# Patient Record
Sex: MALE | Race: ASIAN | Employment: OTHER | URBAN - METROPOLITAN AREA
[De-identification: names, ages, dates, MRNs, and addresses within clinical notes are randomized per-mention and may not be internally consistent; named-entity substitution may affect disease eponyms.]

---

## 2017-01-01 ENCOUNTER — OFFICE VISIT (OUTPATIENT)
Dept: FAMILY MEDICINE | Facility: CLINIC | Age: 42
End: 2017-01-01
Payer: COMMERCIAL

## 2017-01-01 ENCOUNTER — NURSE TRIAGE (OUTPATIENT)
Dept: NURSING | Facility: CLINIC | Age: 42
End: 2017-01-01

## 2017-01-01 ENCOUNTER — TELEPHONE (OUTPATIENT)
Dept: NURSING | Facility: CLINIC | Age: 42
End: 2017-01-01

## 2017-01-01 VITALS
DIASTOLIC BLOOD PRESSURE: 88 MMHG | OXYGEN SATURATION: 99 % | HEART RATE: 77 BPM | HEIGHT: 70 IN | RESPIRATION RATE: 16 BRPM | BODY MASS INDEX: 25.05 KG/M2 | WEIGHT: 175 LBS | SYSTOLIC BLOOD PRESSURE: 120 MMHG | TEMPERATURE: 98.1 F

## 2017-01-01 DIAGNOSIS — I10 BENIGN ESSENTIAL HYPERTENSION: ICD-10-CM

## 2017-01-01 DIAGNOSIS — M25.562 ACUTE PAIN OF LEFT KNEE: ICD-10-CM

## 2017-01-01 DIAGNOSIS — Z00.01 ENCOUNTER FOR ROUTINE ADULT MEDICAL EXAM WITH ABNORMAL FINDINGS: Primary | ICD-10-CM

## 2017-01-01 LAB
ANION GAP SERPL CALCULATED.3IONS-SCNC: 5 MMOL/L (ref 3–14)
BUN SERPL-MCNC: 13 MG/DL (ref 7–30)
CALCIUM SERPL-MCNC: 9.1 MG/DL (ref 8.5–10.1)
CHLORIDE SERPL-SCNC: 105 MMOL/L (ref 94–109)
CHOLEST SERPL-MCNC: 240 MG/DL
CO2 SERPL-SCNC: 29 MMOL/L (ref 20–32)
CREAT SERPL-MCNC: 1.03 MG/DL (ref 0.66–1.25)
GFR SERPL CREATININE-BSD FRML MDRD: 79 ML/MIN/1.7M2
GLUCOSE SERPL-MCNC: 111 MG/DL (ref 70–99)
HBA1C MFR BLD: 5.3 % (ref 4.3–6)
HDLC SERPL-MCNC: 69 MG/DL
LDLC SERPL CALC-MCNC: 152 MG/DL
NONHDLC SERPL-MCNC: 171 MG/DL
POTASSIUM SERPL-SCNC: 4.6 MMOL/L (ref 3.4–5.3)
SODIUM SERPL-SCNC: 139 MMOL/L (ref 133–144)
TRIGL SERPL-MCNC: 96 MG/DL

## 2017-01-01 PROCEDURE — 80061 LIPID PANEL: CPT | Performed by: FAMILY MEDICINE

## 2017-01-01 PROCEDURE — 80048 BASIC METABOLIC PNL TOTAL CA: CPT | Performed by: FAMILY MEDICINE

## 2017-01-01 PROCEDURE — 83036 HEMOGLOBIN GLYCOSYLATED A1C: CPT | Performed by: FAMILY MEDICINE

## 2017-01-01 PROCEDURE — 99396 PREV VISIT EST AGE 40-64: CPT | Performed by: FAMILY MEDICINE

## 2017-01-01 PROCEDURE — 36415 COLL VENOUS BLD VENIPUNCTURE: CPT | Performed by: FAMILY MEDICINE

## 2017-01-01 RX ORDER — NIFEDIPINE 60 MG/1
60 TABLET, EXTENDED RELEASE ORAL DAILY
Qty: 90 TABLET | Refills: 3 | Status: SHIPPED | OUTPATIENT
Start: 2017-01-01

## 2017-01-09 NOTE — Clinical Note
[facility]  [address] [place], [address]  [phone]      January 10, 2017    Otto BULLOCK Lara  [address]  [facility] [address]          Dear Otto,    Your cholesterol is still elevated, higher than last time, please make sure you start weight loss plan, exercise 3 hours/week, and start eating more vegetables, fruits, nuts, and fish products (twice weekly), and eat less of processed food, fast food, white bread/pasta, baked sweets, bagels,and fried food.  Enclosed is a copy of the results.  It was a pleasure to see you at your last appointment.    If you have any questions or concerns, please call myself or my nurse at [phone].          Sincerely,    Lizabeth Ng MD      Results for orders placed or performed in visit on 01/09/17   Basic metabolic panel   Result Value Ref Range    Sodium 139 133 - 144 mmol/L    Potassium 4.6 3.4 - 5.3 mmol/L    Chloride 105 94 - 109 mmol/L    Carbon Dioxide 29 20 - 32 mmol/L    Anion Gap 5 3 - 14 mmol/L    Glucose 111 (H) 70 - 99 mg/dL    Urea Nitrogen 13 7 - 30 mg/dL    Creatinine 1.03 0.66 - 1.25 mg/dL    GFR Estimate 79 >60 mL/min/1.7m2    GFR Estimate If Black >90   GFR Calc   >60 mL/min/1.7m2    Calcium 9.1 8.5 - 10.1 mg/dL   Lipid panel reflex to direct LDL   Result Value Ref Range    Cholesterol 240 (H) <200 mg/dL    Triglycerides 96 <150 mg/dL    HDL Cholesterol 69 >39 mg/dL    LDL Cholesterol Calculated 152 (H) <100 mg/dL    Non HDL Cholesterol 171 (H) <130 mg/dL   Hemoglobin A1c   Result Value Ref Range    Hemoglobin A1C 5.3 4.3 - 6.0 %     [place]

## 2017-01-09 NOTE — NURSING NOTE
"Chief Complaint   Patient presents with     Physical     Refill Request     Initial /88 mmHg  Pulse 77  Temp(Src) 98.1  F (36.7  C) (Oral)  Resp 16  Ht 5' 10\" (1.778 m)  Wt 175 lb (79.379 kg)  BMI 25.11 kg/m2  SpO2 99% Estimated body mass index is 25.11 kg/(m^2) as calculated from the following:    Height as of this encounter: 5' 10\" (1.778 m).    Weight as of this encounter: 175 lb (79.379 kg)..  BP completed using cuff size large  Ada Herron CMA      "

## 2017-01-09 NOTE — PROGRESS NOTES
SUBJECTIVE:     CC: Otto Lara is an 41 year old male who presents for preventative health visit.     Healthy Habits:    Do you get at least three servings of calcium containing foods daily (dairy, green leafy vegetables, etc.)? yes    Amount of exercise or daily activities, outside of work: n/a    Problems taking medications regularly No    Medication side effects: No    Have you had an eye exam in the past two years? yes    Do you see a dentist twice per year? yes    Do you have sleep apnea, excessive snoring or daytime drowsiness?no            Today's PHQ-2 Score: 0  PHQ-2 ( 1999 [employer]) 1/25/2016 5/18/2015   Q1: Little interest or pleasure in doing things 0 0   Q2: Feeling down, depressed or hopeless 0 0   PHQ-2 Score 0 0       Abuse: Current or Past(Physical, Sexual or Emotional)- No  Do you feel safe in your environment - Yes    Social History   Substance Use Topics     Smoking status: Former Smoker -- 0.50 packs/day for 12 years     Types: Cigarettes     Quit date: 01/01/2005     Smokeless tobacco: Never Used     Alcohol Use: 8.4 - 12.6 oz/week     14-21 Standard drinks or equivalent per week      Comment: socially     The patient does not drink >3 drinks per day nor >7 drinks per week.    Last PSA: No results found for: PSA    Recent Labs   Lab Test  01/25/16   0851  03/02/15   0822  08/11/14   0900   CHOL  212*  196  209*   HDL  71  70  61   LDL  127*  100  125   TRIG  69  128  115   CHOLHDLRATIO   --   2.8  3.4   NHDL  141*   --    --        Reviewed orders with patient. Reviewed health maintenance and updated orders accordingly - Yes    All Histories reviewed and updated in Epic.  Past Medical History   Diagnosis Date     NONSPECIFIC MEDICAL HISTORY      unremarkabld     HTN (hypertension) 3/2/2015     Seborrheic keratosis 5/18/2015     Pleomorphic adenoma of salivary gland 5/18/2015      Past Surgical History   Procedure Laterality Date     C nonspecific procedure       none     Excise gland  submandibular Right 3/20/2015     Procedure: EXCISE GLAND SUBMANDIBULAR;  Surgeon: Jhonatan Skelton MD;  Location: [facility] OR       HTN : well controlled.     Knee pain in the R side of knee, mainly when he run or exercise the knee swell up and cracks.  ROS:  C: NEGATIVE for fever, chills, change in weight  I: NEGATIVE for worrisome rashes, moles or lesions  E: NEGATIVE for vision changes or irritation  ENT: NEGATIVE for ear, mouth and throat problems  R: NEGATIVE for significant cough or SOB  CV: NEGATIVE for chest pain, palpitations or peripheral edema  GI: NEGATIVE for nausea, abdominal pain, heartburn, or change in bowel habits   male: negative for dysuria, hematuria, decreased urinary stream, erectile dysfunction, urethral discharge  M: NEGATIVE for significant arthralgias or myalgia  N: NEGATIVE for weakness, dizziness or paresthesias  P: NEGATIVE for changes in mood or affect    Problem list, Medication list, Allergies, and Medical/Social/Surgical histories reviewed in [facility] and updated as appropriate.  Labs reviewed in EPIC  OBJECTIVE:     There were no vitals taken for this visit.  EXAM:  GENERAL: healthy, alert and no distress  EYES: Eyes grossly normal to inspection, PERRL and conjunctivae and sclerae normal  HENT: ear canals and TM's normal, nose and mouth without ulcers or lesions  NECK: no adenopathy, no asymmetry, masses, or scars and thyroid normal to palpation  RESP: lungs clear to auscultation - no rales, rhonchi or wheezes  CV: regular rate and rhythm, normal S1 S2, no S3 or S4, no murmur, click or rub, no peripheral edema and peripheral pulses strong  ABDOMEN: soft, nontender, no hepatosplenomegaly, no masses and bowel sounds normal  MS: Knee exam:  Inspection:normal   no antalgic gait,no soft tissue tenderness over knee, effusion -,  range of motion of the knee is normal, - anterior and posterior drawer sign, - pivot-shift, collateral ligaments intact, - Lyndon sign, - Apley's sign, - Lachmann  "sign, no tenderness over tibial tubercle.  X-ray: not indicated.      SKIN: no suspicious lesions or rashes  NEURO: Normal strength and tone, mentation intact and speech normal  PSYCH: mentation appears normal, affect normal/bright    ASSESSMENT/PLAN:     1. Encounter for routine adult medical exam with abnormal findings  Talked about diet  - Hemoglobin A1c    2. Benign essential hypertension  Well controlled.  - NIFEdipine ER osmotic (PROCARDIA XL) 60 MG TB24; Take 1 tablet (60 mg) by mouth daily  Dispense: 90 tablet; Refill: 3  - Basic metabolic panel  - Lipid panel reflex to direct LDL    3. Acute pain of left knee  Mostly chondromalacia. May use tylenol and motrin, talked about strengthening exercises.      COUNSELING:  Reviewed preventive health counseling, as reflected in patient instructions       Regular exercise         reports that he quit smoking about 12 years ago. His smoking use included Cigarettes. He has a 6 pack-year smoking history. He has never used smokeless tobacco.    Estimated body mass index is 25.97 kg/(m^2) as calculated from the following:    Height as of 4/17/16: 5' 10\" (1.778 m).    Weight as of 4/17/16: 181 lb (82.101 kg).       Counseling Resources:  ATP IV Guidelines  Pooled Cohorts Equation Calculator  FRAX Risk Assessment  ICSI Preventive Guidelines  Dietary Guidelines for Americans, 2010  USDA's MyPlate  ASA Prophylaxis  Lung CA Screening    Lizabeth Ng MD  [facility]"

## 2017-01-09 NOTE — MR AVS SNAPSHOT
After Visit Summary   1/9/2017    Otto Lara    MRN: [id number]           Patient Information     Date Of Birth          1975        Visit Information        Provider Department      1/9/2017 11:30 AM Lizabeth Ng MD [facility]        Today's Diagnoses     Encounter for routine adult medical exam with abnormal findings    -  1     Benign essential hypertension           Care Instructions      Preventive Health Recommendations  Male Ages 40 to 49    Yearly exam:             See your health care provider every year in order to  o   Review health changes.   o   Discuss preventive care.    o   Review your medicines if your doctor has prescribed any.    You should be tested each year for STDs (sexually transmitted diseases) if you re at risk.     Have a cholesterol test every 5 years.     Have a colonoscopy (test for colon cancer) if someone in your family has had colon cancer or polyps before age 50.     After age 45, have a diabetes test (fasting glucose). If you are at risk for diabetes, you should have this test every 3 years.      Talk with your health care provider about whether or not a prostate cancer screening test (PSA) is right for you.    Shots: Get a flu shot each year. Get a tetanus shot every 10 years.     Nutrition:    Eat at least 5 servings of fruits and vegetables daily.     Eat whole-grain bread, whole-wheat pasta and brown rice instead of white grains and rice.     Talk to your provider about Calcium and Vitamin D.     Lifestyle    Exercise for at least 150 minutes a week (30 minutes a day, 5 days a week). This will help you control your weight and prevent disease.     Limit alcohol to one drink per day.     No smoking.     Wear sunscreen to prevent skin cancer.     See your dentist every six months for an exam and cleaning.            Follow-ups after your visit        Who to contact     If you have questions or need follow up information about today's clinic  "visit or your schedule please contact [facility] directly at [phone].  Normal or non-critical lab and imaging results will be communicated to you by MyChart, letter or phone within 4 business days after the clinic has received the results. If you do not hear from us within 7 days, please contact the clinic through [employer] or phone. If you have a critical or abnormal lab result, we will notify you by phone as soon as possible.  Submit refill requests through [employer] or call your pharmacy and they will forward the refill request to us. Please allow 3 business days for your refill to be completed.          Additional Information About Your Visit        MyChart Information     [employer] lets you send messages to your doctor, view your test results, renew your prescriptions, schedule appointments and more. To sign up, go to [email or web address] . Click on \"Log in\" on the left side of the screen, which will take you to the Welcome page. Then click on \"Sign up Now\" on the right side of the page.     You will be asked to enter the access code listed below, as well as some personal information. Please follow the directions to create your username and password.     Your access code is: [email or web address]  Expires: 2017 11:51 AM     Your access code will  in 90 days. If you need help or a new code, please call your [facility] clinic or [phone].        Care EveryWhere ID     This is your Care EveryWhere ID. This could be used by other organizations to access your [facility] medical records  [id number]        Your Vitals Were     Pulse Temperature Respirations Height BMI (Body Mass Index) Pulse Oximetry    77 98.1  F (36.7  C) (Oral) 16 5' 10\" (1.778 m) 25.11 kg/m2 99%       Blood Pressure from Last 3 Encounters:   17 120/88   16 120/86   16 114/77    Weight from Last 3 Encounters:   17 175 lb (79.379 kg)   16 181 lb (82.101 kg)   16 173 lb (78.472 kg)         "      We Performed the Following     Basic metabolic panel     Hemoglobin A1c     Lipid panel reflex to direct LDL          Where to get your medicines      These medications were sent to [facility] - [address]   [address]  [address], [place] [address]     Phone:  [phone]    - NIFEdipine ER osmotic 60 MG Tb24       Primary Care Provider Office Phone # Fax #    Lizabeth Ng [phone]       [address] [address]        Thank you!     Thank you for choosing [facility]  for your care. Our goal is always to provide you with excellent care. Hearing back from our patients is one way we can continue to improve our services. Please take a few minutes to complete the written survey that you may receive in the mail after your visit with us. Thank you!             Your Updated Medication List - Protect others around you: Learn how to safely use, store and throw away your medicines at www.disposemymeds.org.          This list is accurate as of: 1/9/17 11:51 AM.  Always use your most recent med list.                   Brand Name Dispense Instructions for use    NIFEdipine ER osmotic 60 MG Tb24    PROCARDIA XL    90 tablet    Take 1 tablet (60 mg) by mouth daily

## 2017-11-09 NOTE — TELEPHONE ENCOUNTER
Reason for Call: Giselle, an investigator with the [facility] Medical Examiners Office, calls and says that Otto was found  today, at 1:51 PM, and an autopsy is being planned. Giselle says that she is looking for a date of pt's last clinic visit and for information of pt's past medical history. Giselle says that she did not want to speak to Medical Records, to get this information, but needs to speak to pt's  tomorrow. Giselle says that tomorrow, the  can speak to any investigator, at: [phone]. RN then left this message in EPIC, as requested  Routed to: Dr. Ng and care team  Chel Mack RN   [facility]  [phone]

## 2017-11-09 NOTE — TELEPHONE ENCOUNTER
Reason for Disposition    [1] Caller requesting NON-URGENT health information AND [2] PCP's office is the best resource    Additional Information    Negative: [1] Caller is not with the adult (patient) AND [2] reporting urgent symptoms    Negative: Lab result questions    Negative: Medication questions    Negative: Caller cannot be reached by phone    Negative: Caller has already spoken to PCP or another triager    Negative: RN needs further essential information from caller in order to complete triage    Negative: Requesting regular office appointment    Protocols used: INFORMATION ONLY CALL-ADULT-    Giselle, an investigator with the [facility] Medical Examiners Office, calls and says that Otto was found  today, at 1:51 PM, and an autopsy is being planned. Giselle says that she is looking for a date of pt's last clinic visit and for information of pt's past medical history. Giselle says that she did not want to speak to Medical Records, to get this information, but needs to speak to pt's  tomorrow. Giselle says that tomorrow, the  can speak to any investigator, at: [phone]. RN then left this message in [facility], as requested.

## 2017-11-09 NOTE — TELEPHONE ENCOUNTER
I called the medical examiner about this very sad news, I knew the patient for a few years now, and it was choking to hear that he , I discussed the patient past medical history, she got all the information needed.